# Patient Record
Sex: FEMALE | Race: WHITE | URBAN - METROPOLITAN AREA
[De-identification: names, ages, dates, MRNs, and addresses within clinical notes are randomized per-mention and may not be internally consistent; named-entity substitution may affect disease eponyms.]

---

## 2018-03-02 ENCOUNTER — OFFICE VISIT (OUTPATIENT)
Dept: FAMILY MEDICINE CLINIC | Age: 48
End: 2018-03-02

## 2018-03-02 DIAGNOSIS — J01.90 ACUTE RHINOSINUSITIS: Primary | ICD-10-CM

## 2018-03-02 RX ORDER — FLUTICASONE PROPIONATE 50 MCG
2 SPRAY, SUSPENSION (ML) NASAL DAILY
Qty: 1 BOTTLE | Refills: 3 | Status: SHIPPED | OUTPATIENT
Start: 2018-03-02 | End: 2020-10-26

## 2018-03-09 ENCOUNTER — OFFICE VISIT (OUTPATIENT)
Dept: FAMILY MEDICINE CLINIC | Age: 48
End: 2018-03-09

## 2018-03-09 VITALS
WEIGHT: 253.8 LBS | RESPIRATION RATE: 16 BRPM | OXYGEN SATURATION: 98 % | TEMPERATURE: 97.1 F | HEIGHT: 64 IN | BODY MASS INDEX: 43.33 KG/M2 | DIASTOLIC BLOOD PRESSURE: 94 MMHG | HEART RATE: 73 BPM | SYSTOLIC BLOOD PRESSURE: 134 MMHG

## 2018-03-09 DIAGNOSIS — J01.90 ACUTE RHINOSINUSITIS: Primary | ICD-10-CM

## 2018-03-09 PROBLEM — E66.01 OBESITY, MORBID (HCC): Status: ACTIVE | Noted: 2018-03-09

## 2018-03-09 RX ORDER — IBUPROFEN 200 MG
600 TABLET ORAL AS NEEDED
COMMUNITY

## 2018-03-09 RX ORDER — OXYMETAZOLINE HCL 0.05 %
2 SPRAY, NON-AEROSOL (ML) NASAL 2 TIMES DAILY
Qty: 15 ML | Refills: 0 | Status: SHIPPED | OUTPATIENT
Start: 2018-03-09 | End: 2018-03-12

## 2018-03-09 NOTE — PATIENT INSTRUCTIONS
Saline Nasal Washes: Care Instructions  Your Care Instructions  Saline nasal washes help keep the nasal passages open by washing out thick or dried mucus. This simple remedy can help relieve symptoms of allergies, sinusitis, and colds. It also can make the nose feel more comfortable by keeping the mucous membranes moist. You may notice a little burning sensation in your nose the first few times you use the solution, but this usually gets better in a few days. Follow-up care is a key part of your treatment and safety. Be sure to make and go to all appointments, and call your doctor if you are having problems. It's also a good idea to know your test results and keep a list of the medicines you take. How can you care for yourself at home? · You can buy premixed saline solution in a squeeze bottle or other sinus rinse products at a drugstore. Read and follow the instructions on the label. · You also can make your own saline solution by adding 1 teaspoon of salt and 1 teaspoon of baking soda to 2 cups of distilled water. · If you use a homemade solution, pour a small amount into a clean bowl. Using a rubber bulb syringe, squeeze the syringe and place the tip in the salt water. Pull a small amount of the salt water into the syringe by relaxing your hand. · Sit down with your head tilted slightly back. Do not lie down. Put the tip of the bulb syringe or the squeeze bottle a little way into one of your nostrils. Gently drip or squirt a few drops into the nostril. Repeat with the other nostril. Some sneezing and gagging are normal at first.  · Gently blow your nose. · Wipe the syringe or bottle tip clean after each use. · Repeat this 2 or 3 times a day. · Use nasal washes gently if you have nosebleeds often. When should you call for help? Watch closely for changes in your health, and be sure to contact your doctor if:  ? · You often get nosebleeds. ? · You have problems doing the nasal washes.    Where can you learn more? Go to http://lina-mark.info/. Enter 071 [id number] [id number] in the search box to learn more about \"Saline Nasal Washes: Care Instructions. \"  Current as of: May 12, 2017  Content Version: 11.4  © 4981-5258 [employer]. Care instructions adapted under license by [employer] (which disclaims liability or warranty for this information). If you have questions about a medical condition or this instruction, always ask your healthcare professional. [address] any warranty or liability for your use of this information. Sinusitis: Care Instructions  Your Care Instructions    Sinusitis is an infection of the lining of the sinus cavities in your head. Sinusitis often follows a cold. It causes pain and pressure in your head and face. In most cases, sinusitis gets better on its own in 1 to 2 weeks. But some mild symptoms may last for several weeks. Sometimes antibiotics are needed. Follow-up care is a key part of your treatment and safety. Be sure to make and go to all appointments, and call your doctor if you are having problems. It's also a good idea to know your test results and keep a list of the medicines you take. How can you care for yourself at home? · Take an over-the-counter pain medicine, such as acetaminophen (Tylenol), ibuprofen (Advil, Motrin), or naproxen (Aleve). Read and follow all instructions on the label. · If the doctor prescribed antibiotics, take them as directed. Do not stop taking them just because you feel better. You need to take the full course of antibiotics. · Be careful when taking over-the-counter cold or flu medicines and Tylenol at the same time. Many of these medicines have acetaminophen, which is Tylenol. Read the labels to make sure that you are not taking more than the recommended dose. Too much acetaminophen (Tylenol) can be harmful.   · Breathe warm, moist air from a steamy shower, a hot bath, or a sink filled with hot water. Avoid cold, dry air. Using a humidifier in your home may help. Follow the directions for cleaning the machine. · Use saline (saltwater) nasal washes to help keep your nasal passages open and wash out mucus and bacteria. You can buy saline nose drops at a grocery store or drugstore. Or you can make your own at home by adding 1 teaspoon of salt and 1 teaspoon of baking soda to 2 cups of distilled water. If you make your own, fill a bulb syringe with the solution, insert the tip into your nostril, and squeeze gently. [place] [employer] your nose. · Put a hot, wet towel or a warm gel pack on your face 3 or 4 times a day for 5 to 10 minutes each time. · Try a decongestant nasal spray like oxymetazoline (Afrin). Do not use it for more than 3 days in a row. Using it for more than 3 days can make your congestion worse. When should you call for help? Call your doctor now or seek immediate medical care if:  ? · You have new or worse swelling or redness in your face or around your eyes. ? · You have a new or higher fever. ? Watch closely for changes in your health, and be sure to contact your doctor if:  ? · You have new or worse facial pain. ? · The mucus from your nose becomes thicker (like pus) or has new blood in it. ? · You are not getting better as expected. Where can you learn more? Go to http://lina-mark.info/. Enter H627 in the search box to learn more about \"Sinusitis: Care Instructions. \"  Current as of: May 12, 2017  Content Version: 11.4  © 8069-5419 [employer]. Care instructions adapted under license by [employer] (which disclaims liability or warranty for this information). If you have questions about a medical condition or this instruction, always ask your healthcare professional. [address] any warranty or liability for your use of this information.

## 2018-03-09 NOTE — MR AVS SNAPSHOT
Jayjay Carty [place] [address] [facility] [place]. [address] [facility] [address] [id number] [phone] Patient: Stacey Corado MRN: [id number] Visit Information Date & Time Provider Department Dept. Phone Encounter #  
 3/9/2018 12:30 PM Hector Matos, [address] [facility] [id number] Follow-up Instructions Return in about 1 week (around 3/16/2018), or if symptoms worsen or fail to improve. Upcoming Health Maintenance Date Due DTaP/Tdap/Td series (1 - Tdap) 9/27/1993 PAP AKA CERVICAL CYTOLOGY 9/27/1993 Allergies as of 3/9/2018  Review Complete On: 3/9/2018 By: Chase Akins LPN Severity Noted Reaction Type Reactions Flexeril [Cyclobenzaprine] High 07/15/2014   Intolerance Rash Current Immunizations  Reviewed on 10/11/2016 No immunizations on file. Not reviewed this visit You Were Diagnosed With   
  
 Codes Comments Acute rhinosinusitis    -  Primary ICD-10-CM: J01.90 ICD-9-CM: 461.9 Vitals BP Pulse Temp Resp Height(growth percentile) Weight(growth percentile) (!) 134/94 (BP 1 Location: Left arm, BP Patient Position: Sitting) 73 97.1 °F (36.2 °C) (Oral) 16 5' 4\" (1.626 m) 253 lb 12.8 oz (115.1 kg) LMP SpO2 BMI OB Status Smoking Status 03/07/2018 (Exact Date) 98% 43.56 kg/m2 Having regular periods Former Smoker BMI and BSA Data Body Mass Index Body Surface Area  
 43.56 kg/m 2 2.28 m 2 Preferred Pharmacy Pharmacy Name Phone [id number] [address] [id number] [address] [place] [address] [phone] Your Updated Medication List  
  
   
This list is accurate as of 3/9/18  1:11 PM.  Always use your most recent med list.  
  
  
  
  
 fluticasone 50 mcg/actuation nasal spray Commonly known as:  Dasha Medin 2 Sprays by Both Nostrils route daily. MOTRIN  mg tablet Generic drug:  ibuprofen Take 600 mg by mouth as needed for Pain. oxymetazoline 0.05 % nasal spray Commonly known as:  AFRIN (OXYMETAZOLINE) 2 Sprays by Both Nostrils route two (2) times a day for 3 days. Prescriptions Sent to Pharmacy Refills  
 oxymetazoline (AFRIN, OXYMETAZOLINE,) 0.05 % nasal spray 0 Si Sprays by Both Nostrils route two (2) times a day for 3 days. Class: Normal  
 Pharmacy: [facility] [id number] [address] [address]  #: [phone] Route: Both Nostrils Follow-up Instructions Return in about 1 week (around 3/16/2018), or if symptoms worsen or fail to improve. Patient Instructions Saline Nasal Washes: Care Instructions Your Care Instructions Saline nasal washes help keep the nasal passages open by washing out thick or dried mucus. This simple remedy can help relieve symptoms of allergies, sinusitis, and colds. It also can make the nose feel more comfortable by keeping the mucous membranes moist. You may notice a little burning sensation in your nose the first few times you use the solution, but this usually gets better in a few days. Follow-up care is a key part of your treatment and safety. Be sure to make and go to all appointments, and call your doctor if you are having problems. It's also a good idea to know your test results and keep a list of the medicines you take. How can you care for yourself at home? · You can buy premixed saline solution in a squeeze bottle or other sinus rinse products at a drugstore. Read and follow the instructions on the label. · You also can make your own saline solution by adding 1 teaspoon of salt and 1 teaspoon of baking soda to 2 cups of distilled water. · If you use a homemade solution, pour a small amount into a clean bowl. Using a rubber bulb syringe, squeeze the syringe and place the tip in the salt water.  Pull a small amount of the salt water into the syringe by relaxing your hand. · Sit down with your head tilted slightly back. Do not lie down. Put the tip of the bulb syringe or the squeeze bottle a little way into one of your nostrils. Gently drip or squirt a few drops into the nostril. Repeat with the other nostril. Some sneezing and gagging are normal at first. 
· Gently blow your nose. · Wipe the syringe or bottle tip clean after each use. · Repeat this 2 or 3 times a day. · Use nasal washes gently if you have nosebleeds often. When should you call for help? Watch closely for changes in your health, and be sure to contact your doctor if: 
? · You often get nosebleeds. ? · You have problems doing the nasal washes. Where can you learn more? Go to http://lina-mark.info/. Enter [id number] in the search box to learn more about \"Saline Nasal Washes: Care Instructions. \" Current as of: May 12, 2017 Content Version: 11.4 © 4013-7077 [employer]ibrow. Care instructions adapted under license by [employer] (which disclaims liability or warranty for this information). If you have questions about a medical condition or this instruction, always ask your healthcare professional. [place] [address] any warranty or liability for your use of this information. Sinusitis: Care Instructions Your Care Instructions Sinusitis is an infection of the lining of the sinus cavities in your head. Sinusitis often follows a cold. It causes pain and pressure in your head and face. In most cases, sinusitis gets better on its own in 1 to 2 weeks. But some mild symptoms may last for several weeks. Sometimes antibiotics are needed. Follow-up care is a key part of your treatment and safety. Be sure to make and go to all appointments, and call your doctor if you are having problems. It's also a good idea to know your test results and keep a list of the medicines you take. How can you care for yourself at home? · Take an over-the-counter pain medicine, such as acetaminophen (Tylenol), ibuprofen (Advil, Motrin), or naproxen (Aleve). Read and follow all instructions on the label. · If the doctor prescribed antibiotics, take them as directed. Do not stop taking them just because you feel better. You need to take the full course of antibiotics. · Be careful when taking over-the-counter cold or flu medicines and Tylenol at the same time. Many of these medicines have acetaminophen, which is Tylenol. Read the labels to make sure that you are not taking more than the recommended dose. Too much acetaminophen (Tylenol) can be harmful. · Breathe warm, moist air from a steamy shower, a hot bath, or a sink filled with hot water. Avoid cold, dry air. Using a humidifier in your home may help. Follow the directions for cleaning the machine. · Use saline (saltwater) nasal washes to help keep your nasal passages open and wash out mucus and bacteria. You can buy saline nose drops at a grocery store or drugstore. Or you can make your own at home by adding 1 teaspoon of salt and 1 teaspoon of baking soda to 2 cups of distilled water. If you make your own, fill a bulb syringe with the solution, insert the tip into your nostril, and squeeze gently. [place] [employer] your nose. · Put a hot, wet towel or a warm gel pack on your face 3 or 4 times a day for 5 to 10 minutes each time. · Try a decongestant nasal spray like oxymetazoline (Afrin). Do not use it for more than 3 days in a row. Using it for more than 3 days can make your congestion worse. When should you call for help? Call your doctor now or seek immediate medical care if: 
? · You have new or worse swelling or redness in your face or around your eyes. ? · You have a new or higher fever. ? Watch closely for changes in your health, and be sure to contact your doctor if: 
? · You have new or worse facial pain. ? · The mucus from your nose becomes thicker (like pus) or has new blood in it. ? · You are not getting better as expected. Where can you learn more? Go to http://lina-mark.info/. Enter A544 in the search box to learn more about \"Sinusitis: Care Instructions. \" Current as of: May 12, 2017 Content Version: 11.4 © 7707-3863 Cleverbug. Care instructions adapted under license by [employer] (which disclaims liability or warranty for this information). If you have questions about a medical condition or this instruction, always ask your healthcare professional. [address] any warranty or liability for your use of this information. Introducing [facility]! [employer] introduces WikiYou patient portal. Now you can access parts of your medical record, email your doctor's office, and request medication refills online. 1. In your internet browser, go to [email or web address] 2. Click on the First Time User? Click Here link in the Sign In box. You will see the New Member Sign Up page. 3. Enter your WikiYou Access Code exactly as it appears below. You will not need to use this code after youve completed the sign-up process. If you do not sign up before the expiration date, you must request a new code. · WikiYou Access Code: [email or web address] Expires: 5/31/2018  1:24 PM 
 
4. Enter the last four digits of your Social Security Number (xxxx) and Date of Birth (mm/dd/yyyy) as indicated and click Submit. You will be taken to the next sign-up page. 5. Create a WikiYou ID. This will be your WikiYou login ID and cannot be changed, so think of one that is secure and easy to remember. 6. Create a WikiYou password. You can change your password at any time. 7. Enter your Password Reset Question and Answer. This can be used at a later time if you forget your password. 8. Enter your e-mail address. You will receive e-mail notification when new information is available in [address] 9. Click Sign Up. You can now view and download portions of your medical record. 10. Click the Download Summary menu link to download a portable copy of your medical information. If you have questions, please visit the Frequently Asked Questions section of the [employer] website. Remember, [employer] is NOT to be used for urgent needs. For medical emergencies, dial 911. Now available from your iPhone and Android! Please provide this summary of care documentation to your next provider. Your primary care clinician is listed as IMELDA Davis. If you have any questions after today's visit, please call [phone].

## 2018-03-09 NOTE — PROGRESS NOTES
[facility]    CC: F/U of Acute Rhinosinusitis    HPI:   Patient consented to medical student participating in visit.    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[address]     Thyroid disease        Past Surgical History:   Procedure Laterality Date    HX  SECTION      x1     HX CHOLECYSTECTOMY      HX GYN      tubal 0    HX TUMOR REMOVAL  2007    states fatty tumor removed from neck        Family History   Problem Relation Age of Onset    Diabetes Mother     Parkinson's Disease Father     Thyroid Disease Father      para thyroid        Social History     Social History    Marital status:      Spouse name: N/A    Number of children: N/A    Years of education: N/A     Social History Main Topics    Smoking status: Former Smoker     Types: Cigarettes     Quit date: 4/15/1996    Smokeless tobacco: Never Used    Alcohol use 1.2 oz/week     2 Shots of liquor per week    Drug use: No    Sexual activity: Yes     Partners: Male     Birth control/ protection: None     Other Topics Concern    None     Social History Narrative       Allergies   Allergen Reactions    Flexeril [Cyclobenzaprine] Rash         Current Outpatient Prescriptions:     ibuprofen (MOTRIN IB) 200 mg tablet, Take 600 mg by mouth as needed for Pain., Disp: , Rfl:     fluticasone (FLONASE) 50 mcg/actuation nasal spray, 2 Sprays by Both Nostrils route daily. , Disp: 1 Bottle, Rfl: 3    Physical Exam:      BP (!) 134/94 (BP 1 Location: Left arm, BP Patient Position: Sitting)  Pulse 73  Temp 97.1 °F (36.2 °C) (Oral)   Resp 16  Ht 5' 4\" (1.626 m)  Wt 253 lb 12.8 oz (115.1 kg)  LMP 03/07/2018 (Exact Date)  SpO2 98%  BMI 43.56 kg/m2    General:  Obese habitus, NAD  Eyes: sclera clear bilaterally, no discharge noted, eyelids normal in appearance  HENT: NCAT, nasal turbinates enlarged (R>L), right frontal/maxillary sinus tenderness, oropharynx with cobblestoning, MMM. TMs translucent, nonerythematous, with notable air fluid levels nonpurulent (serous) effusions. Lungs: CTAB, Normal respiratory effort and rate  CV: RRR, no MRGs  ABD: soft, non-tender, non-distended, normal bowel sounds  Skin: normal temperature, turgor, color, and texture  Psych: alert and oriented to person, place and time, normal affect  Neuro: Speech normal, Moving all extremities, gait normal      Assessment/Plan     Acute Rhinosinusitis, Failing to improve as expected:  - Will start on 3 day regimen of oxymetazoline nasal spray  - Advised to continue trying nasal washes  - Handout given on sinusitis care and nasal saline wash  - Follow up in one week if symptoms worsen or fail to improve    Original note transcribed by Hamida Santiago MS3.  Reviewed and edited as needed.      Juana Barba MD  3/9/2018, 12:49 PM

## 2018-03-09 NOTE — PROGRESS NOTES
1. Have you been to the ER, urgent care clinic since your last visit? Hospitalized since your last visit? No    2. Have you seen or consulted any other health care providers outside of the [address] [place] [address] since your last visit? Include any pap smears or colon screening.  No

## 2018-03-16 ENCOUNTER — OFFICE VISIT (OUTPATIENT)
Dept: FAMILY MEDICINE CLINIC | Age: 48
End: 2018-03-16

## 2018-03-16 VITALS
OXYGEN SATURATION: 97 % | HEIGHT: 64 IN | DIASTOLIC BLOOD PRESSURE: 84 MMHG | SYSTOLIC BLOOD PRESSURE: 150 MMHG | BODY MASS INDEX: 43.31 KG/M2 | WEIGHT: 253.7 LBS | TEMPERATURE: 95.9 F | RESPIRATION RATE: 18 BRPM | HEART RATE: 65 BPM

## 2018-03-16 DIAGNOSIS — J31.0 RHINOSINUSITIS: ICD-10-CM

## 2018-03-16 DIAGNOSIS — J32.9 RHINOSINUSITIS: ICD-10-CM

## 2018-03-16 DIAGNOSIS — H65.01 RIGHT ACUTE SEROUS OTITIS MEDIA, RECURRENCE NOT SPECIFIED: Primary | ICD-10-CM

## 2018-03-16 RX ORDER — AMOXICILLIN AND CLAVULANATE POTASSIUM 500; 125 MG/1; MG/1
1 TABLET, FILM COATED ORAL 3 TIMES DAILY
Qty: 30 TAB | Refills: 0 | Status: SHIPPED | OUTPATIENT
Start: 2018-03-16 | End: 2018-03-26

## 2018-03-16 NOTE — MR AVS SNAPSHOT
Jayjay Carty [place] [address] [facility] [place]. [address] [facility] 83 [id number] 
[phone] Patient: Enrrique Awad MRN: [id number] BRENDA:[id number] Visit Information Date & Time Provider Department Dept. Phone Encounter #  
 3/16/2018  1:00 PM Yobany Davis, [address] [facility] [phone] [id number] Follow-up Instructions Return in about 1 week (around 3/23/2018), or if symptoms worsen or fail to improve. Upcoming Health Maintenance Date Due DTaP/Tdap/Td series (1 - Tdap) 9/27/1993 PAP AKA CERVICAL CYTOLOGY 9/27/1993 Allergies as of 3/16/2018  Review Complete On: 3/16/2018 By: Latonya Jain Severity Noted Reaction Type Reactions Flexeril [Cyclobenzaprine] High 07/15/2014   Intolerance Rash Current Immunizations  Reviewed on 10/11/2016 No immunizations on file. Not reviewed this visit You Were Diagnosed With   
  
 Codes Comments Right acute serous otitis media, recurrence not specified    -  Primary ICD-10-CM: H65.01 
ICD-9-CM: 381.01 Rhinosinusitis     ICD-10-CM: J32.9 ICD-9-CM: 473.9 Vitals BP Pulse Temp Resp Height(growth percentile) Weight(growth percentile) 150/84 (BP 1 Location: Left arm, BP Patient Position: Sitting) 65 95.9 °F (35.5 °C) (Oral) 18 5' 4\" (1.626 m) 253 lb 11.2 oz (115.1 kg) LMP SpO2 BMI OB Status Smoking Status 03/07/2018 (Exact Date) 97% 43.55 kg/m2 Having regular periods Former Smoker BMI and BSA Data Body Mass Index Body Surface Area 43.55 kg/m 2 2.28 m 2 Preferred Pharmacy Pharmacy Name Phone [facility]. [address] [address] [place] [address] [phone] Your Updated Medication List  
  
   
This list is accurate as of 3/16/18  1:25 PM.  Always use your most recent med list.  
  
  
  
  
 amoxicillin-clavulanate 500-125 mg per tablet Commonly known as:  AUGMENTIN  
 Take 1 Tab by mouth three (3) times daily for 10 days. Indications: Acute Otitis Media  
  
 fluticasone 50 mcg/actuation nasal spray Commonly known as:  Leartis  2 Sprays by Both Nostrils route daily. MOTRIN  mg tablet Generic drug:  ibuprofen Take 600 mg by mouth as needed for Pain. Prescriptions Sent to Pharmacy Refills  
 amoxicillin-clavulanate (AUGMENTIN) 500-125 mg per tablet 0 Sig: Take 1 Tab by mouth three (3) times daily for 10 days. Indications: Acute Otitis Media Class: Normal  
 Pharmacy: [facility] [id number] [address] [address]  #: [phone] Route: Oral  
  
Follow-up Instructions Return in about 1 week (around 3/23/2018), or if symptoms worsen or fail to improve. Patient Instructions Chronic Sinusitis: Care Instructions Your Care Instructions Sinusitis is an infection of the lining of the sinus cavities in your head. It causes pain and pressure in your head and face. Sinusitis can be short-term (acute) or long-term (chronic). Chronic sinusitis lasts 12 weeks or longer. It is often caused by a bacterial or fungal infection. Other things, such as allergies, may also be involved. Chronic sinusitis may be hard to treat. It can lead to permanent changes in the mucous membranes that line the sinuses. It may make future sinus infections more likely. The infection may take some time to treat. Antibiotics are usually used if the infection is caused by bacteria. You may also need to use a corticosteroid nasal spray. If the infection is not cured after you try two or more different antibiotics, you may want to talk with your doctor about surgery or allergy testing. If the sinusitis is caused by a fungal infection, you may need to take antifungals or other medicines. You may also need surgery. Follow-up care is a key part of your treatment and safety.  Be sure to make and go to all appointments, and call your doctor if you are having problems. It's also a good idea to know your test results and keep a list of the medicines you take. How can you care for yourself at home? Medicines ? · Be safe with medicines. Take your medicines exactly as prescribed. Call your doctor if you think you are having a problem with your medicine. You will get more details on the specific medicines your doctor prescribes. ? · Take your antibiotics as directed. Do not stop taking them just because you feel better. You need to take the full course of antibiotics. ? · Your doctor may recommend a corticosteroid nasal spray, wash, drops, or pills. Take this medicine exactly as prescribed. ?At home ? · Breathe warm, moist air. You can use a steamy shower, a hot bath, or a sink filled with hot water. Avoid cold, dry air. Using a humidifier in your home may help. Follow the instructions for cleaning the machine. ? · Use saline (saltwater) nasal washes every day. This helps keep your nasal passages open. It also can wash out mucus and bacteria. ¨ You can buy saline nose drops at a grocery store or drugstore. ¨ You can make your own at home. Add 1 teaspoon of salt and 1 teaspoon of baking soda to 2 cups of distilled water. If you make your own, fill a bulb syringe with the solution. Then insert the tip into your nostril and squeeze gently. Gwynda Finical your nose. ? · Put a warm, wet towel or a warm gel pack on your face 3 or 4 times a day. Leave it on 5 to 10 minutes each time. ? · Do not smoke or breathe secondhand smoke. Smoking can make sinusitis worse. If you need help quitting, talk to your doctor about stop-smoking programs and medicines. These can increase your chances of quitting for good. When should you call for help? Call your doctor now or seek immediate medical care if: 
? · You have new or worse symptoms of infection, such as: 
¨ Increased pain, swelling, warmth, or redness. ¨ Red streaks leading from the area. ¨ Pus draining from the area. ¨ A fever. ? Watch closely for changes in your health, and be sure to contact your doctor if: 
? · The mucus from your nose becomes thicker (like pus) or has new blood in it. ? · You do not get better as expected. Where can you learn more? Go to http://lina-mark.info/. Enter D950 in the search box to learn more about \"Chronic Sinusitis: Care Instructions. \" Current as of: May 12, 2017 Content Version: 11.4 © 9553-3837 [employer]. Care instructions adapted under license by [employer] (which disclaims liability or warranty for this information). If you have questions about a medical condition or this instruction, always ask your healthcare professional. [address] any warranty or liability for your use of this information. Middle Ear Fluid: Care Instructions Your Care Instructions Fluid often builds up inside the ear during a cold or allergies. Usually the fluid drains away, but sometimes a small tube in the ear, called the eustachian tube, stays blocked for months. Symptoms of fluid buildup may include: · Popping, ringing, or a feeling of fullness or pressure in the ear. · Trouble hearing. · Balance problems and dizziness. In most cases, you can treat yourself at home. Follow-up care is a key part of your treatment and safety. Be sure to make and go to all appointments, and call your doctor if you are having problems. It's also a good idea to know your test results and keep a list of the medicines you take. How can you care for yourself at home? · In most cases, the fluid clears up within a few months without treatment. You may need more tests if the fluid does not clear up after 3 months. · If your doctor prescribed antibiotics, take them as directed. Do not stop taking them just because you feel better.  You need to take the full course of antibiotics. When should you call for help? Call your doctor now or seek immediate medical care if: 
? · You have symptoms of infection, such as: 
¨ Increased pain, swelling, warmth, or redness. ¨ Pus draining from the area. ¨ A fever. ? Watch closely for changes in your health, and be sure to contact your doctor if: 
? · You notice changes in hearing. ? · You do not get better as expected. Where can you learn more? Go to http://lina-mark.info/. Enter H892 in the search box to learn more about \"Middle Ear Fluid: Care Instructions. \" Current as of: May 12, 2017 Content Version: 11.4 © 9181-9589 Chinacars. Care instructions adapted under license by [employer] (which disclaims liability or warranty for this information). If you have questions about a medical condition or this instruction, always ask your healthcare professional. [place] [address] any warranty or liability for your use of this information. Introducing [facility]! [facility] introduces [employer] patient portal. Now you can access parts of your medical record, email your doctor's office, and request medication refills online. 1. In your internet browser, go to [email or web address] 2. Click on the First Time User? Click Here link in the Sign In box. You will see the New Member Sign Up page. 3. Enter your [employer] Access Code exactly as it appears below. You will not need to use this code after youve completed the sign-up process. If you do not sign up before the expiration date, you must request a new code. · [employer] Access Code: [email or web address] Expires: 5/31/2018  2:24 PM 
 
4. Enter the last four digits of your Social Security Number (xxxx) and Date of Birth (mm/dd/yyyy) as indicated and click Submit. You will be taken to the next sign-up page. 5. Create a [employer] ID.  This will be your [employer] login ID and cannot be changed, so think of one that is secure and easy to remember. 6. Create a [employer] password. You can change your password at any time. 7. Enter your Password Reset Question and Answer. This can be used at a later time if you forget your password. 8. Enter your e-mail address. You will receive e-mail notification when new information is available in [address] 9. Click Sign Up. You can now view and download portions of your medical record. 10. Click the Download Summary menu link to download a portable copy of your medical information. If you have questions, please visit the Frequently Asked Questions section of the [employer] website. Remember, [employer] is NOT to be used for urgent needs. For medical emergencies, dial 911. Now available from your iPhone and Android! Please provide this summary of care documentation to your next provider. Your primary care clinician is listed as IMELDA Davis. If you have any questions after today's visit, please call [phone].

## 2018-03-16 NOTE — PATIENT INSTRUCTIONS
Chronic Sinusitis: Care Instructions  Your Care Instructions    Sinusitis is an infection of the lining of the sinus cavities in your head. It causes pain and pressure in your head and face. Sinusitis can be short-term (acute) or long-term (chronic). Chronic sinusitis lasts 12 weeks or longer. It is often caused by a bacterial or fungal infection. Other things, such as allergies, may also be involved. Chronic sinusitis may be hard to treat. It can lead to permanent changes in the mucous membranes that line the sinuses. It may make future sinus infections more likely. The infection may take some time to treat. Antibiotics are usually used if the infection is caused by bacteria. You may also need to use a corticosteroid nasal spray. If the infection is not cured after you try two or more different antibiotics, you may want to talk with your doctor about surgery or allergy testing. If the sinusitis is caused by a fungal infection, you may need to take antifungals or other medicines. You may also need surgery. Follow-up care is a key part of your treatment and safety. Be sure to make and go to all appointments, and call your doctor if you are having problems. It's also a good idea to know your test results and keep a list of the medicines you take. How can you care for yourself at home? Medicines  ? · Be safe with medicines. Take your medicines exactly as prescribed. Call your doctor if you think you are having a problem with your medicine. You will get more details on the specific medicines your doctor prescribes. ? · Take your antibiotics as directed. Do not stop taking them just because you feel better. You need to take the full course of antibiotics. ? · Your doctor may recommend a corticosteroid nasal spray, wash, drops, or pills. Take this medicine exactly as prescribed. ?At home  ? · Breathe warm, moist air. You can use a steamy shower, a hot bath, or a sink filled with hot water.  Avoid cold, dry air. Using a humidifier in your home may help. Follow the instructions for cleaning the machine. ? · Use saline (saltwater) nasal washes every day. This helps keep your nasal passages open. It also can wash out mucus and bacteria. ¨ You can buy saline nose drops at a grocery store or drugstore. ¨ You can make your own at home. Add 1 teaspoon of salt and 1 teaspoon of baking soda to 2 cups of distilled water. If you make your own, fill a bulb syringe with the solution. Then insert the tip into your nostril and squeeze gently. Elza Solian your nose. ? · Put a warm, wet towel or a warm gel pack on your face 3 or 4 times a day. Leave it on 5 to 10 minutes each time. ? · Do not smoke or breathe secondhand smoke. Smoking can make sinusitis worse. If you need help quitting, talk to your doctor about stop-smoking programs and medicines. These can increase your chances of quitting for good. When should you call for help? Call your doctor now or seek immediate medical care if:  ? · You have new or worse symptoms of infection, such as:  ¨ Increased pain, swelling, warmth, or redness. ¨ Red streaks leading from the area. ¨ Pus draining from the area. ¨ A fever. ? Watch closely for changes in your health, and be sure to contact your doctor if:  ? · The mucus from your nose becomes thicker (like pus) or has new blood in it. ? · You do not get better as expected. Where can you learn more? Go to http://lina-mark.info/. Enter F990 in the search box to learn more about \"Chronic Sinusitis: Care Instructions. \"  Current as of: May 12, 2017  Content Version: 11.4  © 7883-5130 [employer]. Care instructions adapted under license by i[employer]ettle (which disclaims liability or warranty for this information).  If you have questions about a medical condition or this instruction, always ask your healthcare professional. [address] any warranty or liability for your use of this information. Middle Ear Fluid: Care Instructions  Your Care Instructions    Fluid often builds up inside the ear during a cold or allergies. Usually the fluid drains away, but sometimes a small tube in the ear, called the eustachian tube, stays blocked for months. Symptoms of fluid buildup may include:  · Popping, ringing, or a feeling of fullness or pressure in the ear. · Trouble hearing. · Balance problems and dizziness. In most cases, you can treat yourself at home. Follow-up care is a key part of your treatment and safety. Be sure to make and go to all appointments, and call your doctor if you are having problems. It's also a good idea to know your test results and keep a list of the medicines you take. How can you care for yourself at home? · In most cases, the fluid clears up within a few months without treatment. You may need more tests if the fluid does not clear up after 3 months. · If your doctor prescribed antibiotics, take them as directed. Do not stop taking them just because you feel better. You need to take the full course of antibiotics. When should you call for help? Call your doctor now or seek immediate medical care if:  ? · You have symptoms of infection, such as:  ¨ Increased pain, swelling, warmth, or redness. ¨ Pus draining from the area. ¨ A fever. ? Watch closely for changes in your health, and be sure to contact your doctor if:  ? · You notice changes in hearing. ? · You do not get better as expected. Where can you learn more? Go to http://lina-mark.info/. Enter A160 in the search box to learn more about \"Middle Ear Fluid: Care Instructions. \"  Current as of: May 12, 2017  Content Version: 11.4  © 3569-1756 Moblyng. Care instructions adapted under license by [employer] (which disclaims liability or warranty for this information).  If you have questions about a medical condition or this instruction, always ask your healthcare professional. [place] [address] any warranty or liability for your use of this information.

## 2018-03-16 NOTE — PROGRESS NOTES
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[facility] [address]     Thyroid disease        Past Surgical History:   Procedure Laterality Date    HX  SECTION      x1     HX CHOLECYSTECTOMY      HX GYN      tubal 0    HX TUMOR REMOVAL  2007    states fatty tumor removed from neck        Family History   Problem Relation Age of Onset    Diabetes Mother     Parkinson's Disease Father     Thyroid Disease Father      para thyroid        Social History     Social History    Marital status:      Spouse name: N/A    Number of children: N/A    Years of education: N/A     Social History Main Topics    Smoking status: Former Smoker     Types: Cigarettes     Quit date: 4/15/1996    Smokeless tobacco: Never Used    Alcohol use 1.2 oz/week     2 Shots of liquor per week    Drug use: No    Sexual activity: Yes     Partners: Male     Birth control/ protection: None     Other Topics Concern    None     Social History Narrative       Allergies   Allergen Reactions    Flexeril [Cyclobenzaprine] Rash         Current Outpatient Prescriptions:     ibuprofen (MOTRIN IB) 200 mg tablet, Take 600 mg by mouth as needed for Pain., Disp: , Rfl:     fluticasone (FLONASE) 50 mcg/actuation nasal spray, 2 Sprays by Both Nostrils route daily. , Disp: 1 Bottle, Rfl: 3    Physical Exam:      /84 (BP 1 Location: Left arm, BP Patient Position: Sitting)  Pulse 65  Temp 95.9 °F (35.5 °C) (Oral)   Resp 18  Ht 5' 4\" (1.626 m) Wt 253 lb 11.2 oz (115.1 kg)  LMP 03/07/2018 (Exact Date)  SpO2 97%  BMI 43.55 kg/m2    General: Obese habitus, NAD  Eyes: sclera clear bilaterally, no discharge noted, eyelids normal in appearance  HENT: NCAT, nasal turbinates enlarged bilaterally (Improved from prior visit), oropharynx w/ cobblestoning, MMM, right frontal and maxillary sinus tenderness, right TM retracted and opaque (Changed from prior visit), left TM with notable air fluid levels nonpurulent (serous) effusion  Neck: supple, no lymphadenopathy  Lungs: CTAB, Normal respiratory effort and rate  CV: RRR, no MRGs  ABD: soft, non-tender, non-distended, normal bowel sounds  Skin: normal temperature, turgor, color, and texture  Psych: alert and oriented to person, place and time, normal affect  Neuro: Speech normal, Moving all extremities, gait normal      Assessment/Plan     Rhinosinusitis, Failing to improve as expected:  - Will treat as acute bacterial rhinosinusitis, as she has failed to improve after >10 days of symptomatic management  - Will start on antibiotic therapy   - Follow up in one week if symptoms fails to improve or worsens      Acute Right Otitis Media:  - Will start on empiric antibiotic therapy  - Follow up in one week if symptoms fails to improve or worsens    Original note transcribed by Moira Stewart MS3. Note reviewed and edited as needed.        Rita Jackson MD  3/16/2018, 1:12 PM

## 2018-03-16 NOTE — PROGRESS NOTES
1. Have you been to the ER, urgent care clinic since your last visit? Hospitalized since your last visit? No    2. Have you seen or consulted any other health care providers outside of the [address] [place] [address] since your last visit? Include any pap smears or colon screening. No       Patient here today for headaches times a few weeks.

## 2020-03-18 ENCOUNTER — OFFICE VISIT (OUTPATIENT)
Dept: FAMILY MEDICINE CLINIC | Age: 50
End: 2020-03-18

## 2020-03-18 VITALS
TEMPERATURE: 97.4 F | OXYGEN SATURATION: 97 % | HEART RATE: 75 BPM | RESPIRATION RATE: 16 BRPM | HEIGHT: 64 IN | WEIGHT: 249 LBS | SYSTOLIC BLOOD PRESSURE: 143 MMHG | BODY MASS INDEX: 42.51 KG/M2 | DIASTOLIC BLOOD PRESSURE: 92 MMHG

## 2020-03-18 DIAGNOSIS — L23.7 POISON IVY DERMATITIS: Primary | ICD-10-CM

## 2020-03-18 RX ORDER — TRIAMCINOLONE ACETONIDE 0.25 MG/G
CREAM TOPICAL 2 TIMES DAILY
Qty: 15 G | Refills: 0 | Status: SHIPPED | OUTPATIENT
Start: 2020-03-18 | End: 2020-10-26

## 2020-03-18 RX ORDER — PREDNISONE 10 MG/1
TABLET ORAL
Qty: 13 TAB | Refills: 0 | Status: SHIPPED | OUTPATIENT
Start: 2020-03-18 | End: 2020-10-26

## 2020-03-18 NOTE — PATIENT INSTRUCTIONS
Poison [place], [place], and Sum[place]: Care Instructions Your Care Instructions Poison ivy, poison oak, and poison sumac are plants that can cause a skin rash upon contact. The red, itchy rash often shows up in lines or streaks and may cause fluid-filled blisters or large, raised hives. The rash is caused by an allergic reaction to an oil in poison ivy, oak, and sumac. The rash may occur when you touch the plant or when you touch clothing, pet fur, sporting gear, gardening tools, or other objects that have come in contact with one of these plants. You cannot catch or spread the rash, even if you touch it or the blister fluid, because the plant oil will already have been absorbed or washed off the skin. The rash may seem to be spreading, but either it is still developing from earlier contact or you have touched something that still has the plant oil on it. Follow-up care is a key part of your treatment and safety. Be sure to make and go to all appointments, and call your doctor if you are having problems. It's also a good idea to know your test results and keep a list of the medicines you take. How can you care for yourself at home? · If your doctor prescribed a cream, use it as directed. If your doctor prescribed medicine, take it exactly as prescribed. Call your doctor if you think you are having a problem with your medicine. · Use cold, wet cloths to reduce itching. · Keep cool, and stay out of the sun. · Leave the rash open to the air. · Wash all clothing or other things that may have come in contact with the plant oil. · Avoid most lotions and ointments until the rash heals. Calamine lotion may help relieve symptoms of a plant rash. Use it 3 or 4 times a day. To prevent poison ivy exposure If you know that you will be near poison ivy, oak, or sumac, you can try these options: · Use a product designed to help prevent plant oil from getting on the skin. These products, such as Ivy X Pre-Contact Skin Solution, come in lotions, sprays, or towelettes. You put the product on your skin right before you go outdoors. · If you did not use a preventive product and you have had contact with plant oil, clean it off your skin as soon as possible. Use a product such as Tecnu Original Outdoor Skin Cleanser. These products can also be used to clean plant oil from clothing or tools. When should you call for help? Call your doctor now or seek immediate medical care if: 
  · Your rash gets worse, and you start to feel bad and have a fever, a stiff neck, nausea, and vomiting.  
  · You have signs of infection, such as: 
? Increased pain, swelling, warmth, or redness. ? Red streaks leading from the rash. ? Pus draining from the rash. ? A fever.  
 Watch closely for changes in your health, and be sure to contact your doctor if: 
  · You have new blisters or bruises, or the rash spreads and looks like a sunburn.  
  · The rash gets worse, or it comes back after nearly disappearing.  
  · You think a medicine you are using is making your rash worse.  
  · Your rash does not clear up after 1 to 2 weeks of home treatment.  
  · You have joint aches or body aches with your rash. Where can you learn more? Go to http://lina-mark.info/ Enter Z008 in the search box to learn more about \"Poison [place], [place], and [place]: Care Instructions. \" Current as of: October 30, 2019Content Version: 12.4 © 8414-7408 Healthwise, Incorporated. Care instructions adapted under license by Snapguide (which disclaims liability or warranty for this information). If you have questions about a medical condition or this instruction, always ask your healthcare professional. [address] any warranty or liability for your use of this information.

## 2020-03-18 NOTE — PROGRESS NOTES
Chief Complaint   Patient presents with    Rash     arms, legs x 4 days        1. Have you been to the ER, urgent care clinic since your last visit? Hospitalized since your last visit? NO    2. Have you seen or consulted any other health care providers outside of the [address] [place] [address] since your last visit? Include any pap smears or colon screening.  NO

## 2020-03-18 NOTE — PROGRESS NOTES
Corinne Flowers               [phone]      Tram Jeffers is a 54 y.o. female and presents with Rash (arms, legs x 4 days )       Assessment/Plan:    Diagnoses and all orders for this visit:    1. Poison ivy dermatitis  -     predniSONE (DELTASONE) 10 mg tablet; Day 1: 40mg, day 2: 30 mg, day 3 and 4 20mg, day 5 and 6 10mg  -     triamcinolone acetonide (KENALOG) 0.025 % topical cream; Apply  to affected area two (2) times a day. use thin layer    Given her history of working in the yard recently this is most likely a poison ivy or possibly a poison oak. We will give a steroid taper over the next 6 days and a prescription for Kenalog to apply topically for the pruritus    Follow-up and Dispositions    · Return in about 3 months (around 6/18/2020) for Annual physical, w/o gyn, 30 minutes. Subjective:    Rash  Patient complains of rash involving the armsbilateral, lower legsbilateral. Rash started 6 days ago. Appearance of rash at onset: Color of lesion(s): clear, Texture of lesion(s): raised, Size of lesion(s): 0.2mm-1cm, Other appearance: scattered, edges well demarcated. Rash has not changed over time Initial distribution: armsbilateral, lower legsbilateral.  Discomfort associated with rash: pruritic. Associated symptoms: no associated symptoms. Denies: fever, cough, congestion, sore throat, headache, arthralgia, abdominal pain, nausea, vomiting, myalgia, decrease in appetite, decrease in energy level. Patient has not had previous evaluation of rash. Patient has had previous treatment. Response to treatment: OTC cortisone helped some with itching, Calamine helped a little. Patient has not had contacts with similar rash. Patient has identified precipitant. Patient has had new exposures (soaps, lotions, laundry detergents, foods, medications, plants, insects or animals.)  Was gardening and most likely exposed to poison ivy or poison oak.      ROS: As stated in HPI, otherwise all others negative. ROS    The problem list was updated as a part of today's visit. 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 SH:  Social History     Tobacco Use    Smoking status: Former Smoker     Types: Cigarettes     Last attempt to quit: 4/15/1996     Years since quittin.9    Smokeless tobacco: Never Used   Substance Use Topics    Alcohol use: Yes     Alcohol/week: 2.0 standard drinks     Types: 2 Shots of liquor per week    Drug use: No       Medications/Allergies:  Current Outpatient Medications on File Prior to Visit   Medication Sig Dispense Refill    ibuprofen (MOTRIN IB) 200 mg tablet Take 600 mg by mouth as needed for Pain.  fluticasone (FLONASE) 50 mcg/actuation nasal spray 2 Sprays by Both Nostrils route daily. 1 Bottle 3     No current facility-administered medications on file prior to visit. Allergies   Allergen Reactions    Flexeril [Cyclobenzaprine] Rash       Objective:  Visit Vitals  BP (!) 143/92   Pulse 75   Temp 97.4 °F (36.3 °C) (Oral)   Resp 16   Ht 5' 4\" (1.626 m)   Wt 249 lb (112.9 kg)   SpO2 97%   BMI 42.74 kg/m²    Body mass index is 42.74 kg/m². Physical assessment  Physical Exam  Vitals signs and nursing note reviewed. HENT:      Head: Normocephalic and atraumatic. Cardiovascular:      Rate and Rhythm: Normal rate and regular rhythm. Heart sounds: Normal heart sounds. Pulmonary:      Effort: Pulmonary effort is normal.      Breath sounds: Normal breath sounds. Skin:     General: Skin is warm and dry. Neurological:      Mental Status: She is alert and oriented to person, place, and time. Gait: Gait is intact.            Labwork and Ancillary Studies:    CBC w/Diff  No results found for: WBC, WBCLT, HGB, HGBP, PLT, HGBEXT, PLTEXT, HGBEXT, PLTEXT      Basic Metabolic Profile  Lab Results   Component Value Date/Time    Sodium 140 08/28/2015 08:10 AM    Potassium 5.0 08/28/2015 08:10 AM    Chloride 103 08/28/2015 08:10 AM    CO2 24 08/28/2015 08:10 AM    Glucose 94 08/28/2015 08:10 AM    BUN 15 08/28/2015 08:10 AM    Creatinine 0.59 08/28/2015 08:10 AM    BUN/Creatinine ratio 25 (H) 08/28/2015 08:10 AM    GFR est  08/28/2015 08:10 AM    GFR est non- 08/28/2015 08:10 AM    Calcium 10.2 08/28/2015 08:10 AM        Cholesterol  Lab Results   Component Value Date/Time    Cholesterol, total 155 08/28/2015 08:10 AM    HDL Cholesterol 52 08/28/2015 08:10 AM    LDL, calculated 94 08/28/2015 08:10 AM    Triglyceride 44 08/28/2015 08:10 AM       Health Maintenance:   Health Maintenance   Topic Date Due    DTaP/Tdap/Td series (1 - Tdap) 09/27/1993    PAP AKA CERVICAL CYTOLOGY  09/27/1993    Influenza Age 5 to Adult  08/01/2019    Lipid Screen  08/28/2020    Pneumococcal 0-64 years  Aged Out       I have discussed the diagnosis with the patient and the intended plan as seen in the above orders. The patient has received an After-Visit Summary and questions were answered concerning future plans. An After Visit Summary was printed and given to the patient. All diagnosis have been discussed with the patient and all of the patient's questions have been answered. Follow-up and Dispositions    · Return in about 3 months (around 6/18/2020) for Annual physical, w/o gyn, 30 minutes. Campbell Carter, [facility]  [address] [facility]   [address] [facility] [address]  [id number]

## 2020-03-23 ENCOUNTER — OFFICE VISIT (OUTPATIENT)
Dept: FAMILY MEDICINE CLINIC | Age: 50
End: 2020-03-23

## 2020-10-02 ENCOUNTER — VIRTUAL VISIT (OUTPATIENT)
Dept: FAMILY MEDICINE CLINIC | Age: 50
End: 2020-10-02

## 2020-10-02 DIAGNOSIS — R07.89 OTHER CHEST PAIN: Primary | ICD-10-CM

## 2020-10-02 DIAGNOSIS — E31.21 MEN 1 (MULTIPLE ENDOCRINE NEOPLASIA) (HCC): ICD-10-CM

## 2020-10-02 DIAGNOSIS — E66.01 OBESITY, MORBID (HCC): ICD-10-CM

## 2020-10-02 PROCEDURE — 99213 OFFICE O/P EST LOW 20 MIN: CPT | Performed by: NURSE PRACTITIONER

## 2020-10-02 NOTE — ASSESSMENT & PLAN NOTE
Stable, based on history, physical exam and review of pertinent labs, studies and medications; meds reconciled; continue current treatment plan, lifestyle modifications recommended. Key Obesity Meds     Patient is on no anti-obesity meds.         No results found for: LEPTN, INSUL, HBA1C, GLU, CHOL, CHOLPOCT, HDL, LDLC, LDL, LDLCEXT, LDLCPOC, TRIGL, TGLPOCT, TSH, NA, NAPOC, K, KPOCT, ALTPOC, ALT, ASTPOC, VITD3, CRP, [id number], TSHEXT

## 2020-10-02 NOTE — PROGRESS NOTES
Chief Complaint   Patient presents with   [facility] ED Follow-up     For chest pain     1. Have you been to the ER, urgent care clinic since your last visit? Hospitalized since your last visit? Yes Where: [facility]    2. Have you seen or consulted any other health care providers outside of the [address] [place] [address] since your last visit? Include any pap smears or colon screening.  No     Health Maintenance Due   Topic    DTaP/Tdap/Td series (1 - Tdap)    PAP AKA CERVICAL CYTOLOGY     Lipid Screen     Flu Vaccine (1)

## 2020-10-02 NOTE — PROGRESS NOTES
Corinne Flowers [address]               [phone]      Suad Sandhu is a 52 y.o. female who was seen by synchronous (real-time) audio-video technology on 10/2/2020. Consent: Suad Sandhu, who was seen by synchronous (real-time) audio-video technology, and/or her healthcare decision maker, is aware that this patient-initiated, Telehealth encounter on 10/2/2020 is a billable service, with coverage as determined by her insurance carrier. She is aware that she may receive a bill and has provided verbal consent to proceed: Yes. Assessment & Plan:   Diagnoses and all orders for this visit:    1. Other chest pain  Went to ED for c/o chest pain accompanied by SOB and left arm pain  Had full cardiac workup which was negative  Endorses eating a heavy meal the night before with acidic alcoholic drinks  Endorses having bad heartburn at times but never this bad  With her agreement, will continue to monitor, if she has persistent symptoms of heartburn start prilosec  If she has another episode as bad as this one refer to GI    2. M[facility] 1 (multiple endocrine neoplasia) ([facility]  Assessment & Plan: This condition is managed by Specialist.  No results found for: WBC, WBCT, WBCPOC, HGB, HGBPOC, HCT, HCTPOC, PLT, PLTPOC, CREA, CREAPOC, BUN, IBUN, BUNPOC, K, KPOCT, INR, INREXT, PTP, PTINR, PTEXT, HGBEXT, HCTEXT, PLTEXT, INREXT, PTEXT    Has upcoming appt with endocrinology    3. Obesity, morbid ([facility]  Assessment & Plan:  Stable, based on history, physical exam and review of pertinent labs, studies and medications; meds reconciled; continue current treatment plan, lifestyle modifications recommended. Key Obesity Meds     Patient is on no anti-obesity meds.         No results found for: LEPTN, INSUL, HBA1C, GLU, CHOL, CHOLPOCT, HDL, LDLC, LDL, LDLCEXT, LDLCPOC, TRIGL, TGLPOCT, TSH, NA, NAPOC, K, KPOCT, ALTPOC, ALT, ASTPOC, VITD3, CRP, [id number], TSHEXT          Follow-up and Dispositions    · Return if symptoms worsen or fail to improve. 712  Subjective:   Meghan Soria is a 52 y.o. female who was seen for   ED Follow-up (For chest pain)    Chest pain  Started to have chest pain at home around 0330 accompanied by SOB  Went to ED, had workup, negative for cardiac causes: stress test, EKG, cardiac enzymes  Has never had any pains like this before  Has had bad heartburn in the past  The night before she ate brisket, potato salad, corn and mixed drinks: cognac, rum, lemon juice, triple sec  PMH: used to be on prilosec years ago      Prior to Admission medications    Medication Sig Start Date End Date Taking? Authorizing Provider   ibuprofen (MOTRIN IB) 200 mg tablet Take 600 mg by mouth as needed for Pain. Yes Provider, Historical   predniSONE (DELTASONE) 10 mg tablet Day 1: 40mg, day 2: 30 mg, day 3 and 4 20mg, day 5 and 6 10mg 3/18/20   Miguelrobinson Brown NP   triamcinolone acetonide (KENALOG) 0.025 % topical cream Apply  to affected area two (2) times a day. use thin layer 3/18/20   Miguel Brown NP   fluticasone (FLONASE) 50 mcg/actuation nasal spray 2 Sprays by Both Nostrils route daily. 3/2/18   Ny Mccain MD     Allergies   Allergen Reactions    Flexeril [Cyclobenzaprine] Rash    Magnesium Citrate Itching     Red flushing to chest and neck. No raised areas, no swelling or trouble breathing.         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[address]     Thyroid disease      Past Surgical History:   Procedure Laterality Date    HX  SECTION      x1     HX CHOLECYSTECTOMY      HX GYN      tubal 0    HX TUMOR REMOVAL      states fatty tumor removed from neck      Family History   Problem Relation Age of Onset    Diabetes Mother     Parkinson's Disease Father     Thyroid Disease Father         para thyroid      Social History     Tobacco Use    Smoking status: Former Smoker     Types: Cigarettes     Last attempt to quit: 4/15/1996     Years since quittin.4    Smokeless tobacco: Never Used   Substance Use Topics    Alcohol use: Yes     Alcohol/week: 2.0 standard drinks     Types: 2 Shots of liquor per week       ROS  As stated in HPI, otherwise all others negative. Objective:     Visit Vitals  LMP 2018 (Exact Date)      General: alert, cooperative, no distress   Mental  status: normal mood, behavior, speech, dress, motor activity, and thought processes, able to follow commands   HENT: NCAT   Neck: no visualized mass   Resp: no respiratory distress   Neuro: no gross deficits   Skin: no discoloration or lesions of concern on visible areas   Psychiatric: normal affect, consistent with stated mood, no evidence of hallucinations     Additional exam findings: We discussed the expected course, resolution and complications of the diagnosis(es) in detail. Medication risks, benefits, costs, interactions, and alternatives were discussed as indicated. I advised her to contact the office if her condition worsens, changes or fails to improve as anticipated. She expressed understanding with the diagnosis(es) and plan. Che Montes is a 52 y.o. female who was evaluated by a video visit encounter for concerns as above. Patient identification was verified prior to start of the visit. A caregiver was present when appropriate. Due to this being a TeleHealth encounter (During [facility]- public health emergency), evaluation of the following organ systems was limited: Vitals/Constitutional/EENT/Resp/CV/GI//MS/Neuro/Skin/Heme-Lymph-Imm.   Pursuant to the emergency declaration under the [facility], [facility] waiver authority and the [employer] and [employer] General Act, this Virtual  Visit was conducted, with patient's (and/or legal guardian's) consent, to reduce the patient's risk of exposure to COVID-19 and provide necessary medical care. Services were provided through a video synchronous discussion virtually to substitute for in-person clinic visit. Patient and provider were located at their individual homes. An After Visit Summary was printed and given to the patient. All diagnosis have been discussed with the patient and all of the patient's questions have been answered. Follow-up and Dispositions    · Return if symptoms worsen or fail to improve. Miriam Puckett, [facility] [id number] [address]   Corinne William

## 2020-10-26 ENCOUNTER — OFFICE VISIT (OUTPATIENT)
Dept: FAMILY MEDICINE CLINIC | Age: 50
End: 2020-10-26
Payer: COMMERCIAL

## 2020-10-26 VITALS
OXYGEN SATURATION: 100 % | DIASTOLIC BLOOD PRESSURE: 82 MMHG | TEMPERATURE: 97 F | RESPIRATION RATE: 16 BRPM | BODY MASS INDEX: 40.97 KG/M2 | WEIGHT: 240 LBS | HEART RATE: 67 BPM | SYSTOLIC BLOOD PRESSURE: 134 MMHG | HEIGHT: 64 IN

## 2020-10-26 DIAGNOSIS — Z13.220 LIPID SCREENING: ICD-10-CM

## 2020-10-26 DIAGNOSIS — Z00.00 WELL WOMAN EXAM (NO GYNECOLOGICAL EXAM): Primary | ICD-10-CM

## 2020-10-26 DIAGNOSIS — G47.19 EXCESSIVE DAYTIME SLEEPINESS: ICD-10-CM

## 2020-10-26 DIAGNOSIS — Z90.710 HISTORY OF HYSTERECTOMY: ICD-10-CM

## 2020-10-26 DIAGNOSIS — E55.9 VITAMIN D DEFICIENCY: ICD-10-CM

## 2020-10-26 DIAGNOSIS — E66.01 OBESITY, MORBID (HCC): ICD-10-CM

## 2020-10-26 DIAGNOSIS — E31.21 MEN 1 (MULTIPLE ENDOCRINE NEOPLASIA) (HCC): ICD-10-CM

## 2020-10-26 PROCEDURE — 99396 PREV VISIT EST AGE 40-64: CPT | Performed by: NURSE PRACTITIONER

## 2020-10-26 NOTE — PATIENT INSTRUCTIONS
Learning About the [address] [facility] Diet What is the Mediterranean diet? The Mediterranean diet is a style of eating rather than a diet plan. It features foods eaten in [place], [place], [place] and [place], and other countries along the [facility]. It emphasizes eating foods like fish, fruits, vegetables, beans, high-fiber breads and whole grains, nuts, and olive oil. This style of eating includes limited red meat, cheese, and sweets. Why choose the Mediterranean diet? A Mediterranean-style diet may improve heart health. It contains more fat than other heart-healthy diets. But the fats are mainly from nuts, unsaturated oils (such as fish oils and olive oil), and certain nut or seed oils (such as canola, soybean, or flaxseed oil). These fats may help protect the heart and blood vessels. How can you get started on the Mediterranean diet? Here are some things you can do to switch to a more Mediterranean way of eating. What to eat · Eat a variety of fruits and vegetables each day, such as grapes, blueberries, tomatoes, broccoli, peppers, figs, olives, spinach, eggplant, beans, lentils, and chickpeas. · Eat a variety of whole-grain foods each day, such as oats, brown rice, and whole wheat bread, pasta, and couscous. · Eat fish at least 2 times a week. Try tuna, salmon, mackerel, lake trout, herring, or sardines. · Eat moderate amounts of low-fat dairy products, such as milk, cheese, or yogurt. · Eat moderate amounts of poultry and eggs. · Choose healthy (unsaturated) fats, such as nuts, olive oil, and certain nut or seed oils like canola, soybean, and flaxseed. · Limit unhealthy (saturated) fats, such as butter, palm oil, and coconut oil. And limit fats found in animal products, such as meat and dairy products made with whole milk. Try to eat red meat only a few times a month in very small amounts. · Limit sweets and desserts to only a few times a week.  This includes sugar-sweetened drinks like soda. The Mediterranean diet may also include red wine with your meal1 glass each day for women and up to 2 glasses a day for men. Tips for eating at home · Use herbs, spices, garlic, lemon zest, and citrus juice instead of salt to add flavor to foods. · Add avocado slices to your sandwich instead of kay. · Have fish for lunch or dinner instead of red meat. Brush the fish with olive oil, and broil or grill it. · Sprinkle your salad with seeds or nuts instead of cheese. · Cook with olive or canola oil instead of butter or oils that are high in saturated fat. · Switch from 2% milk or whole milk to 1% or fat-free milk. · Dip raw vegetables in a vinaigrette dressing or hummus instead of dips made from mayonnaise or sour cream. 
· Have a piece of fruit for dessert instead of a piece of cake. Try baked apples, or have some dried fruit. Tips for eating out · Try broiled, grilled, baked, or poached fish instead of having it fried or breaded. · Ask your  to have your meals prepared with olive oil instead of butter. · Order dishes made with marinara sauce or sauces made from olive oil. Avoid sauces made from cream or mayonnaise. · Choose whole-grain breads, whole wheat pasta and pizza crust, brown rice, beans, and lentils. · Cut back on butter or margarine on bread. Instead, you can dip your bread in a small amount of olive oil. · Ask for a side salad or grilled vegetables instead of french fries or chips. Where can you learn more? Go to http://www.gray.com/ Enter 436-741-2273 in the search box to learn more about \"Learning About the Mediterranean Diet. \" Current as of: August 22, 2019               Content Version: 12.6 © 7499-5774 [employer]. Care instructions adapted under license by [employer] (which disclaims liability or warranty for this information).  If you have questions about a medical condition or this instruction, always ask your healthcare professional. [address] any warranty or liability for your use of this information. DASH Diet: Care Instructions Your Care Instructions The DASH diet is an eating plan that can help lower your blood pressure. DASH stands for Dietary Approaches to Stop Hypertension. Hypertension is high blood pressure. The DASH diet focuses on eating foods that are high in calcium, potassium, and magnesium. These nutrients can lower blood pressure. The foods that are highest in these nutrients are fruits, vegetables, low-fat dairy products, nuts, seeds, and legumes. But taking calcium, potassium, and magnesium supplements instead of eating foods that are high in those nutrients does not have the same effect. The DASH diet also includes whole grains, fish, and poultry. The DASH diet is one of several lifestyle changes your doctor may recommend to lower your high blood pressure. Your doctor may also want you to decrease the amount of sodium in your diet. Lowering sodium while following the DASH diet can lower blood pressure even further than just the DASH diet alone. Follow-up care is a key part of your treatment and safety. Be sure to make and go to all appointments, and call your doctor if you are having problems. It's also a good idea to know your test results and keep a list of the medicines you take. How can you care for yourself at home? Following the DASH diet · Eat 4 to 5 servings of fruit each day. A serving is 1 medium-sized piece of fruit, ½ cup chopped or canned fruit, 1/4 cup dried fruit, or 4 ounces (½ cup) of fruit juice. Choose fruit more often than fruit juice. · Eat 4 to 5 servings of vegetables each day. A serving is 1 cup of lettuce or raw leafy vegetables, ½ cup of chopped or cooked vegetables, or 4 ounces (½ cup) of vegetable juice. Choose vegetables more often than vegetable juice. · Get 2 to 3 servings of low-fat and fat-free dairy each day. A serving is 8 ounces of milk, 1 cup of yogurt, or 1 ½ ounces of cheese. · Eat 6 to 8 servings of grains each day. A serving is 1 slice of bread, 1 ounce of dry cereal, or ½ cup of cooked rice, pasta, or cooked cereal. Try to choose whole-grain products as much as possible. · Limit lean meat, poultry, and fish to 2 servings each day. A serving is 3 ounces, about the size of a deck of cards. · Eat 4 to 5 servings of nuts, seeds, and legumes (cooked dried beans, lentils, and split peas) each week. A serving is 1/3 cup of nuts, 2 tablespoons of seeds, or ½ cup of cooked beans or peas. · Limit fats and oils to 2 to 3 servings each day. A serving is 1 teaspoon of vegetable oil or 2 tablespoons of salad dressing. · Limit sweets and added sugars to 5 servings or less a week. A serving is 1 tablespoon jelly or jam, ½ cup sorbet, or 1 cup of lemonade. · Eat less than 2,300 milligrams (mg) of sodium a day. If you limit your sodium to 1,500 mg a day, you can lower your blood pressure even more. Tips for success · Start small. Do not try to make dramatic changes to your diet all at once. You might feel that you are missing out on your favorite foods and then be more likely to not follow the plan. Make small changes, and stick with them. Once those changes become habit, add a few more changes. · Try some of the following: ? Make it a goal to eat a fruit or vegetable at every meal and at snacks. This will make it easy to get the recommended amount of fruits and vegetables each day. ? Try yogurt topped with fruit and nuts for a snack or healthy dessert. ? Add lettuce, tomato, cucumber, and onion to sandwiches. ? Combine a ready-made pizza crust with low-fat mozzarella cheese and lots of vegetable toppings. Try using tomatoes, squash, spinach, broccoli, carrots, cauliflower, and onions. ? Have a variety of cut-up vegetables with a low-fat dip as an appetizer instead of chips and dip. ? Sprinkle sunflower seeds or chopped almonds over salads. Or try adding chopped walnuts or almonds to cooked vegetables. ? Try some vegetarian meals using beans and peas. Add garbanzo or kidney beans to salads. Make burritos and tacos with mashed contreras beans or black beans. Where can you learn more? Go to http://www.gray.com/ Enter I645 in the search box to learn more about \"DASH Diet: Care Instructions. \" Current as of: December 16, 2019               Content Version: 12.6 © 5667-7843 [employer]. Care instructions adapted under license by [employer] (which disclaims liability or warranty for this information). If you have questions about a medical condition or this instruction, always ask your healthcare professional. [place] [address] any warranty or liability for your use of this information.

## 2020-10-26 NOTE — PROGRESS NOTES
Corinne Flowers [address]               [phone]  Subjective:   52 y.o. female for Well Woman Check. She is postmenopausal.  Social History: has sex with males. Pertinent past medical hstory: no history of HTN, DVT, CAD, DM, liver disease, migraines or smoking. Patient Active Problem List   Diagnosis Code    MEN 1 (multiple endocrine neoplasia) ([facility] [address] E31.21    Obesity, morbid ([facility] [address] E66.01    Vitamin D deficiency E55.9    History of hysterectomy Z90.710     Current Outpatient Medications   Medication Sig Dispense Refill    ibuprofen (MOTRIN IB) 200 mg tablet Take 600 mg by mouth as needed for Pain. Allergies   Allergen Reactions    Flexeril [Cyclobenzaprine] Rash    Magnesium Citrate Itching     Red flushing to chest and neck. No raised areas, no swelling or trouble breathing. Past Medical History:   Diagnosis Date    Parathyroid abnormality ([facility] [address]     Thyroid disease      Past Surgical History:   Procedure Laterality Date    HX  SECTION      x1     HX CHOLECYSTECTOMY      HX GYN      tubal [address]    HX TUMOR REMOVAL  2007    states fatty tumor removed from neck      Family History   Problem Relation Age of Onset    Diabetes Mother     Parkinson's Disease Father     Thyroid Disease Father         para thyroid      Social History     Tobacco Use    Smoking status: Former Smoker     Types: Cigarettes     Last attempt to quit: 4/15/1996     Years since quittin.5    Smokeless tobacco: Never Used   Substance Use Topics    Alcohol use: Yes     Alcohol/week: 2.0 standard drinks     Types: 2 Shots of liquor per week        ROS: She is fasting today. Feeling well. No dyspnea or chest pain on exertion. No abdominal pain, change in bowel habits, black or bloody stools. No urinary tract symptoms. Urinates 6-7 times a day and 1-2 times a night. This has been a longstanding problem.  GYN ROS: no breast pain or new or enlarging lumps on self exam, no vaginal bleeding, no discharge or pelvic pain, she complains of vaginal dryness starting after hysterectomy. Menopausal symptoms: night sweats. No neurological complaints. Endorses frequent right ear pain than radiates down her neck accompanied by sore throat. Has not had treatment at these times, symptoms resolve spontaneously. Is currently trying to lose weight. She has cut back on carbs, increased fruits and vegetables. Exercising daily about 30 minutes, using exercise programs. Endorses excessive daytime sleepiness. Last DEXA scan and T-score: none  Last Colonoscopy: none  Last Mammogram: 5/19/2020    Objective:     Visit Vitals  /82   Pulse 67   Temp 97 °F (36.1 °C) (Oral)   Resp 16   Ht 5' 4\" (1.626 m)   Wt 240 lb (108.9 kg)   LMP 03/07/2018 (Exact Date)   SpO2 100%   BMI 41.20 kg/m²     The patient appears well, alert, oriented x 3, in no distress. ENT normal.  Neck supple. No adenopathy or thyromegaly. SHARAD. Lungs are clear, good air entry, no wheezes, rhonchi or rales. S1 and S2 normal, no murmurs, regular rate and rhythm. Abdomen soft without tenderness, guarding, mass or organomegaly. Extremities show no edema, normal peripheral pulses. Neurological is normal, no focal findings. No TMJ. Assessment/Plan:   well woman  additional lab tests per orders  return annually or prn    ICD-10-CM ICD-9-CM     1. Well woman exam (no gynecological exam)  Z44.75 J72.9 METABOLIC PANEL, COMPREHENSIVE Completed today  Labs appropriate for medical conditions and age      METABOLIC PANEL, COMPREHENSIVE    2. MEN 1 (multiple endocrine neoplasia) ([facility] [address]  E31.21 258.01  Managed by endocrinology, has upcoming appointment   3. Obesity, morbid ([facility] [address]  E66.01 278.01  Discussed the patient's BMI.   The BMI follow up plan is as follows:     dietary management education, guidance, and counseling  encourage exercise  monitor weight  prescribed dietary intake  Discussed portion control  Eat fresh or frozen fruits and vegetables, stay away from canned  Limit eating out and fast food  Practice meal planning     4. Vitamin D deficiency  E55.9 268.9 VITAMIN D, 25 HYDROXY F/u levels      VITAMIN D, 25 HYDROXY    5. History of hysterectomy  Z90.710 V88.01  Done in February of 2020, still has her ovaries   6. Lipid screening  Z13.220 V77.91 LIPID PANEL Health maintenance      LIPID PANEL    7. Excessive daytime sleepiness  G47.19 780.54 REFERRAL TO SLEEP STUDIES Refer to sleep studies for possible sleep apnea     Follow-up and Dispositions    · Return in about 1 year (around 10/26/2021) for CPE, w/o gyn, 30 min, office only. Dee Sanchez [place] [id number] [address] [place].   Corinne William

## 2020-10-27 LAB
25(OH)D3+25(OH)D2 SERPL-MCNC: 20.5 NG/ML (ref 30–100)
ALBUMIN SERPL-MCNC: 4.4 G/DL (ref 3.8–4.8)
ALBUMIN/GLOB SERPL: 1.8 {RATIO} (ref 1.2–2.2)
ALP SERPL-CCNC: 103 IU/L (ref 39–117)
ALT SERPL-CCNC: 16 IU/L (ref 0–32)
AST SERPL-CCNC: 11 IU/L (ref 0–40)
BILIRUB SERPL-MCNC: 0.3 MG/DL (ref 0–1.2)
BUN SERPL-MCNC: 11 MG/DL (ref 6–24)
BUN/CREAT SERPL: 19 (ref 9–23)
CALCIUM SERPL-MCNC: 10.9 MG/DL (ref 8.7–10.2)
CHLORIDE SERPL-SCNC: 105 MMOL/L (ref 96–106)
CHOLEST SERPL-MCNC: 179 MG/DL (ref 100–199)
CO2 SERPL-SCNC: 26 MMOL/L (ref 20–29)
CREAT SERPL-MCNC: 0.57 MG/DL (ref 0.57–1)
GLOBULIN SER CALC-MCNC: 2.4 G/DL (ref 1.5–4.5)
GLUCOSE SERPL-MCNC: 92 MG/DL (ref 65–99)
HDLC SERPL-MCNC: 65 MG/DL
INTERPRETATION, 910389: NORMAL
LDLC SERPL CALC-MCNC: 105 MG/DL (ref 0–99)
POTASSIUM SERPL-SCNC: 4.5 MMOL/L (ref 3.5–5.2)
PROT SERPL-MCNC: 6.8 G/DL (ref 6–8.5)
SODIUM SERPL-SCNC: 141 MMOL/L (ref 134–144)
TRIGL SERPL-MCNC: 45 MG/DL (ref 0–149)
VLDLC SERPL CALC-MCNC: 9 MG/DL (ref 5–40)

## 2020-11-03 RX ORDER — ASPIRIN 325 MG
50000 TABLET, DELAYED RELEASE (ENTERIC COATED) ORAL
Qty: 12 CAP | Refills: 0 | Status: SHIPPED | OUTPATIENT
Start: 2020-11-03

## 2020-11-03 NOTE — PROGRESS NOTES
Called pt and informed her of all lab results per NP [facility] note below. Given all information.  Thank you

## 2020-11-03 NOTE — PROGRESS NOTES
Vit D low, RX for 3 months of treatment sent to pharmacy  Rest of labs are normal or within acceptable range

## 2021-05-11 ENCOUNTER — OFFICE VISIT (OUTPATIENT)
Dept: FAMILY MEDICINE CLINIC | Age: 51
End: 2021-05-11
Payer: COMMERCIAL

## 2021-05-11 VITALS
HEART RATE: 74 BPM | HEIGHT: 64 IN | OXYGEN SATURATION: 98 % | RESPIRATION RATE: 16 BRPM | DIASTOLIC BLOOD PRESSURE: 74 MMHG | BODY MASS INDEX: 42.34 KG/M2 | SYSTOLIC BLOOD PRESSURE: 120 MMHG | TEMPERATURE: 98 F | WEIGHT: 248 LBS

## 2021-05-11 DIAGNOSIS — H92.01 RIGHT EAR PAIN: Primary | ICD-10-CM

## 2021-05-11 DIAGNOSIS — E66.01 OBESITY, MORBID (HCC): ICD-10-CM

## 2021-05-11 DIAGNOSIS — E31.21 MEN 1 (MULTIPLE ENDOCRINE NEOPLASIA) (HCC): ICD-10-CM

## 2021-05-11 PROCEDURE — 99213 OFFICE O/P EST LOW 20 MIN: CPT | Performed by: NURSE PRACTITIONER

## 2021-05-11 NOTE — PROGRESS NOTES
Chief Complaint   Patient presents with    Ear Pain       Pt preferred language for health care discussion is english. Is someone accompanying this pt? no    Is the patient using any DME equipment during OV? no    Depression Screening:  3 most recent [facility] Screens 5/11/2021 10/26/2020 10/2/2020 3/18/2020 3/16/2018 3/9/2018 11/1/2016   Little interest or pleasure in doing things Not at all Not at all Not at all Not at all Not at all Not at all Not at all   Feeling down, depressed, irritable, or hopeless Not at all Not at all Not at all Not at all Not at all Not at all Not at all   Total Score PHQ 2 0 0 0 0 0 0 0       Learning Assessment:  Learning Assessment 3/9/2018 7/15/2014   PRIMARY LEARNER Patient Patient   HIGHEST LEVEL OF EDUCATION - PRIMARY LEARNER  2 YEARS OF COLLEGE 4 YEARS OF COLLEGE   BARRIERS PRIMARY LEARNER NONE NONE   PRIMARY LANGUAGE ENGLISH ENGLISH   LEARNER PREFERENCE PRIMARY OTHER (COMMENT) DEMONSTRATION   ANSWERED BY Patient self   RELATIONSHIP SELF SELF         Health Maintenance reviewed and discussed per provider. Yes        Advance Directive:  1. Do you have an advance directive in place? Patient Reply:no    2. If not, would you like material regarding how to put one in place? Patient Reply: no    Coordination of Care:  1. Have you been to the ER, urgent care clinic since your last visit? Hospitalized since your last visit? no    2. Have you seen or consulted any other health care providers outside of the [address] [place] [address] since your last visit? Include any pap smears or colon screening.  no    Provider prefers to do his own med reconciliation

## 2021-05-11 NOTE — PROGRESS NOTES
[place]               [facility] [address]               [phone]      Flor Godwin is a 52 y.o. female and presents with Ear Pain       Assessment/Plan:    Diagnoses and all orders for this visit:    1. Right ear pain    2. MEN 1 (multiple endocrine neoplasia) ([facility] [address]  Assessment & Plan:   monitored by specialist. No acute findings meriting change in the plan      3. Obesity, morbid ([facility] [address]  Assessment & Plan:   borderline controlled, lifestyle modifications recommended      At this time it appears the cause of her ear pain is related to her tooth problem, advised her to finish the antibiotic she is currently taking prescribed by the endodontist.  Weeks Sprout her to monitor her symptoms if they change or worsen or become more specific to her ear we will refer her to ear nose and throat evaluation. We will continue to monitor. She states she is scheduled for surgery on her to be on around July 5. Patient verbalized understanding and is in agreement with this plan of care  Follow-up and Dispositions    · Return if symptoms worsen or fail to improve. Subjective:    Visit from 10/2020:   Aj Founds frequent right ear pain than radiates down her neck accompanied by sore throat. Has not had treatment at these times, symptoms resolve spontaneously. Symptoms continue  Has also had tooth problems on the right side, this has been a problem for years. Recently put on abx for her tooth.   Sometimes the tooth pain and ear pain happen simultaneously  Has spoken to the dentis and endodontist thought it was possible  The pain feels like a stabbing pain in the middle of her ear and other times more of an ache behind her ear and down a little bit  Has episodes daily, at no one time of day, has woken her up from sleep  associated with jaw pain 1/2 the time  Pushing on the problem tooth will cause pain and sometimes the pain radiates to her ear  Denies hearing changes, drainage, feeing of muffled or clogged  Endorses tinnitus recentl    ROS:     ROS  As stated in HPI, otherwise all others negative. The problem list was updated as a part of today's visit. Patient Active Problem List   Diagnosis Code    MEN 1 (multiple endocrine neoplasia) ([facility] [address] E31.21    Obesity, morbid ([facility] [address] E66.01    Vitamin D deficiency E55.9    History of hysterectomy Z90.710       The PSH,  were reviewed. SH:  Social History     Tobacco Use    Smoking status: Former Smoker     Types: Cigarettes     Quit date: 4/15/1996     Years since quittin.0    Smokeless tobacco: Never Used   Substance Use Topics    Alcohol use: Yes     Alcohol/week: 2.0 standard drinks     Types: 2 Shots of liquor per week    Drug use: No       Medications/Allergies:  Current Outpatient Medications on File Prior to Visit   Medication Sig Dispense Refill    cholecalciferol (VITAMIN D3) (50,000 UNITS /1250 MCG) capsule Take 1 Cap by mouth every seven (7) days. 12 Cap 0    ibuprofen (MOTRIN IB) 200 mg tablet Take 600 mg by mouth as needed for Pain. No current facility-administered medications on file prior to visit. Allergies   Allergen Reactions    Flexeril [Cyclobenzaprine] Rash    Magnesium Citrate Itching     Red flushing to chest and neck. No raised areas, no swelling or trouble breathing. Objective:  Visit Vitals  /74 (BP 1 Location: Left upper arm, BP Patient Position: Sitting, BP Cuff Size: Adult)   Pulse 74   Temp 98 °F (36.7 °C) (Temporal)   Resp 16   Ht 5' 4\" (1.626 m)   Wt 248 lb (112.5 kg)   LMP 2018 (Exact Date)   SpO2 98%   BMI 42.57 kg/m²    Body mass index is 42.57 kg/m². Physical assessment  Physical Exam  Vitals signs and nursing note reviewed. Constitutional:       Appearance: Normal appearance.    HENT:      Right Ear: Hearing, tympanic membrane, ear canal and external ear normal.      Left Ear: Hearing, tympanic membrane, ear canal and external ear normal.      Mouth/Throat:      Mouth: Mucous membranes are moist.      Pharynx: Oropharynx is clear. Eyes:      Conjunctiva/sclera: Conjunctivae normal.   Neck:      Musculoskeletal: Normal range of motion and neck supple. No neck rigidity or muscular tenderness. Pulmonary:      Effort: Pulmonary effort is normal.   Lymphadenopathy:      Cervical: No cervical adenopathy. Neurological:      Mental Status: She is alert. Labwork and Ancillary Studies:    CBC w/Diff  No results found for: WBC, WBCLT, HGB, HGBP, PLT, HGBEXT, PLTEXT, HGBEXT, PLTEXT      Basic Metabolic Profile  Lab Results   Component Value Date/Time    Sodium 141 10/26/2020 12:06 PM    Potassium 4.5 10/26/2020 12:06 PM    Chloride 105 10/26/2020 12:06 PM    CO2 26 10/26/2020 12:06 PM    Glucose 92 10/26/2020 12:06 PM    BUN 11 10/26/2020 12:06 PM    Creatinine 0.57 10/26/2020 12:06 PM    BUN/Creatinine ratio 19 10/26/2020 12:06 PM    GFR est  10/26/2020 12:06 PM    GFR est non- 10/26/2020 12:06 PM    Calcium 10.9 (H) 10/26/2020 12:06 PM        Cholesterol  Lab Results   Component Value Date/Time    Cholesterol, total 179 10/26/2020 12:06 PM    HDL Cholesterol 65 10/26/2020 12:06 PM    LDL, calculated 105 (H) 10/26/2020 12:06 PM    LDL, calculated 94 08/28/2015 08:10 AM    Triglyceride 45 10/26/2020 12:06 PM       Health Maintenance:   Health Maintenance   Topic Date Due    Hepatitis C Screening  Never done    DTaP/Tdap/Td series (1 - Tdap) Never done    Flu Vaccine (Season Ended) 09/01/2021    Lipid Screen  10/26/2025    COVID-19 Vaccine  Completed    Pneumococcal 0-64 years  Aged Out       I have discussed the diagnosis with the patient and the intended plan as seen in the above orders. The patient has received an After-Visit Summary and questions were answered concerning future plans. An After Visit Summary was printed and given to the patient. All diagnosis have been discussed with the patient and all of the patient's questions have been answered. Follow-up and Dispositions    · Return if symptoms worsen or fail to improve. Jeff Seo, CARMINEP-BC  [address] [facility]   [address] [facility] [address]  [id number]

## 2021-05-11 NOTE — PATIENT INSTRUCTIONS
At this time we will continue to monitor your ear pain. If the pain increases or changes in anyway, please let me know. The pain does not resolve after you tooth is fixed , I will refer you to ENT.

## 2022-03-18 PROBLEM — E66.01 OBESITY, MORBID (HCC): Status: ACTIVE | Noted: 2018-03-09

## 2022-03-18 PROBLEM — Z90.710 HISTORY OF HYSTERECTOMY: Status: ACTIVE | Noted: 2020-10-26

## 2022-03-19 PROBLEM — E55.9 VITAMIN D DEFICIENCY: Status: ACTIVE | Noted: 2020-10-26

## 2023-05-22 ENCOUNTER — TELEPHONE (OUTPATIENT)
Age: 53
End: 2023-05-22

## 2023-05-22 NOTE — TELEPHONE ENCOUNTER
Contacted patient regarding Lunch and Learn event on 6/1 from 11:30am-1pm.  Patient unable to attend.

## 2024-10-25 NOTE — PROGRESS NOTES
[facility]    CC: HA    HPI:     - Headache x 1 week  - Right sided frontal. Radiation to back. - Characterized as shooting and stabbing  - PMH significant for sinus HAs  - Associated with sore throat (Started today), one episode of epistaxis, and right ear discomfort    ROS: Positive items marked in RED  CON: fever, chills  Cardiovascular: palpitations, CP  Resp: cough, SOB  GI: nausea, vomiting, diarrhea  SKIN: rash, itching  : dysuria, hematuria      Past Medical History:   Diagnosis Date    Parathyroid abnormality ([facility] [address]     Thyroid disease        Past Surgical History:   Procedure Laterality Date    HX  SECTION      x1     HX CHOLECYSTECTOMY      HX GYN      tubal 0    HX TUMOR REMOVAL  2007    states fatty tumor removed from neck        Family History   Problem Relation Age of Onset    Diabetes Mother     Parkinson's Disease Father     Thyroid Disease Father      para thyroid        Social History     Social History    Marital status:      Spouse name: N/A    Number of children: N/A    Years of education: N/A     Social History Main Topics    Smoking status: Former Smoker     Types: Cigarettes     Quit date: 4/15/1996    Smokeless tobacco: Not on file    Alcohol use Yes    Drug use: No    Sexual activity: Yes     Other Topics Concern    Not on file     Social History Narrative       Allergies   Allergen Reactions    Flexeril [Cyclobenzaprine] Rash         Current Outpatient Prescriptions:     fluticasone (FLONASE) 50 mcg/actuation nasal spray, 2 Sprays by Both Nostrils route daily. , Disp: 1 Bottle, Rfl: 3    Physical Exam:      General:  Obese habitus, NAD  Eyes: sclera clear bilaterally, no discharge noted, eyelids normal in appearance  HENT: NCAT, TMs clear w/ no erythema, nasal turbinates enlarged (R>L), right frontal/maxillary sinus tenderness, oropharynx with cobblestoning, MMM.   Lungs: CTAB, Normal respiratory effort and rate  CV: RRR, no MRGs  ABD: soft, non-tender, non-distended, normal bowel sounds  Skin: normal temperature, turgor, color, and texture  Psych: alert and oriented to person, place and time, normal affect  Neuro: Speech normal, Moving all extremities, gait normal      Assessment/Plan     Acute Rhinosinusitis:  - Will start on Flonase  - Given sinus rinse sample   - Follow up as needed if no improvement with treatment      Jany Fong MD  3/2/2018, 5:17 PM Awake